# Patient Record
Sex: FEMALE | Race: WHITE | NOT HISPANIC OR LATINO | URBAN - METROPOLITAN AREA
[De-identification: names, ages, dates, MRNs, and addresses within clinical notes are randomized per-mention and may not be internally consistent; named-entity substitution may affect disease eponyms.]

---

## 2017-05-19 NOTE — H&P ADULT - PROBLEM SELECTOR PLAN 1
Admit to Orthopaedic Service.  Presents today for elective ACDF C4-C5, C5-C6, C6-C7, insertion spine cage, harvest bone graft.  Pt medically stable and cleared for procedure today by Dr. Jensen Sahu.

## 2017-05-19 NOTE — H&P ADULT - NSHPPHYSICALEXAM_GEN_ALL_CORE
Musculoskeletal: Decreased ROM secondary to pain, cervical spine.     Remainder of physical exam as per medical clearance note. Musculoskeletal: Decreased ROM secondary to pain, cervical spine.   Sensation diminished at radial and dorsal aspect of right hand. Motor Strength 5/5 to /interossei/triceps/biceps/deltoid LUE; motor strength 4+/5 to /interossei on RUE, 5/5 to biceps, deltoid. AIN/PIN intact.   Skin warm and well perfused, brisk capillary refill bilateral upper extremities      Remainder of physical exam as per medical clearance note. Musculoskeletal: Decreased ROM secondary to pain, cervical spine.   Sensation diminished at radial and dorsal aspect of right hand. Motor Strength 5/5 to /interossei/triceps/biceps/deltoid LUE; motor strength 4+/5 to /interossei on RUE, 5/5 to biceps, deltoid. AIN/PIN intact.   Skin warm and well perfused, brisk capillary refill bilateral upper extremities  EHL/FHL/TA/GS 5/5 motor strength bilateral lower extremities; SLT mildly decreased at medial aspect of left foot, DP pulses 2+ bilaterally      Remainder of physical exam as per medical clearance note.

## 2017-05-19 NOTE — PATIENT PROFILE ADULT. - PMH
Chronic pain  in neck, bilateral shoulders, lower back  related to work injury Anxiety    Chronic pain  in neck, bilateral shoulders, lower back  related to work injury  Depression Anxiety    Chronic pain  in neck, bilateral shoulders, lower back  related to work injury  Depression    Headache    Insomnia

## 2017-05-19 NOTE — H&P ADULT - PMH
Anxiety    Chronic pain  in neck, bilateral shoulders, lower back  related to work injury  Depression    Insomnia

## 2017-05-19 NOTE — H&P ADULT - NSHPLABSRESULTS_GEN_ALL_CORE
Preop CBC, BMP, PT/PTT/INR, UA - WNL per medical clearance.   Preop EKG normal sinus rhythm - WNL per medical clearance   Preop CXR no acute cardiopulmonary disease - WNL per medical clearance  Preop Pulmonary Function Tests Normal Spirometry - WNL as per medical clearance

## 2017-05-19 NOTE — H&P ADULT - HISTORY OF PRESENT ILLNESS
55 yo F presents c/o neck pain x     Presents today for elective Anterior Cervical Discectomy and Fusion C4-C5, C5-C6, C6-C7, anterior instrumentation, insertion spine cage, harvest bone graft. 55 yo F presents c/o neck pain x 3-4 years. 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 Pt reports bilateral upper and lower extremity numbness/tingling/weakness which she reports as constant and worse on the right side. Pt reports frequent headaches. Pt states her neck pain radiates to bilateral upper extremities. Pt takes naproxen at home for pain relief. Pt does not ambulate with an assistive device at baseline. Denies DVT hx. Denies CP, SOB, N/V, tactile fevers today.    Presents today for elective Anterior Cervical Discectomy and Fusion C4-C5, C5-C6, C6-C7, anterior instrumentation, insertion spine cage, harvest bone graft.

## 2017-05-22 ENCOUNTER — INPATIENT (INPATIENT)
Facility: HOSPITAL | Age: 57
LOS: 2 days | Discharge: HOME | DRG: 472 | End: 2017-05-25
Payer: OTHER MISCELLANEOUS

## 2017-05-22 VITALS
HEART RATE: 62 BPM | OXYGEN SATURATION: 99 % | SYSTOLIC BLOOD PRESSURE: 105 MMHG | HEIGHT: 64 IN | WEIGHT: 153.44 LBS | DIASTOLIC BLOOD PRESSURE: 52 MMHG | TEMPERATURE: 98 F | RESPIRATION RATE: 20 BRPM

## 2017-05-22 DIAGNOSIS — Z98.890 OTHER SPECIFIED POSTPROCEDURAL STATES: Chronic | ICD-10-CM

## 2017-05-22 DIAGNOSIS — M54.12 RADICULOPATHY, CERVICAL REGION: ICD-10-CM

## 2017-05-22 RX ORDER — FAMOTIDINE 10 MG/ML
20 INJECTION INTRAVENOUS EVERY 12 HOURS
Qty: 0 | Refills: 0 | Status: DISCONTINUED | OUTPATIENT
Start: 2017-05-22 | End: 2017-05-25

## 2017-05-22 RX ORDER — CITALOPRAM 10 MG/1
1 TABLET, FILM COATED ORAL
Qty: 0 | Refills: 0 | COMMUNITY

## 2017-05-22 RX ORDER — ONDANSETRON 8 MG/1
4 TABLET, FILM COATED ORAL EVERY 6 HOURS
Qty: 0 | Refills: 0 | Status: DISCONTINUED | OUTPATIENT
Start: 2017-05-22 | End: 2017-05-25

## 2017-05-22 RX ORDER — CEFAZOLIN SODIUM 1 G
2000 VIAL (EA) INJECTION EVERY 8 HOURS
Qty: 0 | Refills: 0 | Status: COMPLETED | OUTPATIENT
Start: 2017-05-22 | End: 2017-05-23

## 2017-05-22 RX ORDER — MAGNESIUM HYDROXIDE 400 MG/1
30 TABLET, CHEWABLE ORAL EVERY 12 HOURS
Qty: 0 | Refills: 0 | Status: DISCONTINUED | OUTPATIENT
Start: 2017-05-22 | End: 2017-05-25

## 2017-05-22 RX ORDER — HYDROMORPHONE HYDROCHLORIDE 2 MG/ML
30 INJECTION INTRAMUSCULAR; INTRAVENOUS; SUBCUTANEOUS
Qty: 0 | Refills: 0 | Status: DISCONTINUED | OUTPATIENT
Start: 2017-05-22 | End: 2017-05-23

## 2017-05-22 RX ORDER — HYDROMORPHONE HYDROCHLORIDE 2 MG/ML
0.5 INJECTION INTRAMUSCULAR; INTRAVENOUS; SUBCUTANEOUS
Qty: 0 | Refills: 0 | Status: DISCONTINUED | OUTPATIENT
Start: 2017-05-22 | End: 2017-05-23

## 2017-05-22 RX ORDER — SODIUM CHLORIDE 9 MG/ML
1000 INJECTION, SOLUTION INTRAVENOUS
Qty: 0 | Refills: 0 | Status: DISCONTINUED | OUTPATIENT
Start: 2017-05-22 | End: 2017-05-25

## 2017-05-22 RX ORDER — NALOXONE HYDROCHLORIDE 4 MG/.1ML
0.1 SPRAY NASAL
Qty: 0 | Refills: 0 | Status: DISCONTINUED | OUTPATIENT
Start: 2017-05-22 | End: 2017-05-25

## 2017-05-22 RX ORDER — DOCUSATE SODIUM 100 MG
100 CAPSULE ORAL THREE TIMES A DAY
Qty: 0 | Refills: 0 | Status: DISCONTINUED | OUTPATIENT
Start: 2017-05-22 | End: 2017-05-25

## 2017-05-22 RX ORDER — METOCLOPRAMIDE HCL 10 MG
10 TABLET ORAL EVERY 6 HOURS
Qty: 0 | Refills: 0 | Status: DISCONTINUED | OUTPATIENT
Start: 2017-05-22 | End: 2017-05-25

## 2017-05-22 RX ADMIN — Medication 100 MILLIGRAM(S): at 18:40

## 2017-05-22 RX ADMIN — HYDROMORPHONE HYDROCHLORIDE 30 MILLILITER(S): 2 INJECTION INTRAMUSCULAR; INTRAVENOUS; SUBCUTANEOUS at 12:44

## 2017-05-22 NOTE — CONSULT NOTE ADULT - SUBJECTIVE AND OBJECTIVE BOX
RYLIE KAUFMAN      Patient is a 56y old  Female who presents with a chief complaint of neck pain (22 May 2017 07:52)      HPI:  57 yo F presents c/o neck pain x 3-4 years. 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 Pt reports bilateral upper and lower extremity numbness/tingling/weakness which she reports as constant and worse on the right side. Pt reports frequent headaches. Pt states her neck pain radiates to bilateral upper extremities. Pt takes naproxen at home for pain relief. Pt does not ambulate with an assistive device at baseline. Denies DVT hx. Denies CP, SOB, N/V, tactile fevers today.    Presents today for elective Anterior Cervical Discectomy and Fusion C4-C5, C5-C6, C6-C7, anterior instrumentation, insertion spine cage, harvest bone graft. (19 May 2017 15:26)      Addl  Medical issues:       HEALTH ISSUES - PROBLEM Dx:  Radiculopathy, cervical region: Radiculopathy, cervical region            MEDICATIONS  (STANDING):  HYDROmorphone PCA (1 mG/mL) 30milliLiter(s) PCA Continuous PCA Continuous  lactated ringers. 1000milliLiter(s) IV Continuous <Continuous>  ceFAZolin   IVPB 2000milliGRAM(s) IV Intermittent every 8 hours  docusate sodium 100milliGRAM(s) Oral three times a day    MEDICATIONS  (PRN):  HYDROmorphone PCA (1 mG/mL) Rescue Clinician Bolus 0.5milliGRAM(s) IV Push every 3 hours PRN for Pain Scale GREATER THAN 6  naloxone Injectable 0.1milliGRAM(s) IV Push every 3 minutes PRN For ANY of the following changes in patient status:  A. RR LESS THAN 10 breaths per minute, B. Oxygen saturation LESS THAN 90%, C. Sedation score of 6  ondansetron Injectable 4milliGRAM(s) IV Push every 6 hours PRN Nausea  famotidine    Tablet 20milliGRAM(s) Oral every 12 hours PRN Dyspepsia  magnesium hydroxide Suspension 30milliLiter(s) Oral every 12 hours PRN Constipation          PAST MEDICAL & SURGICAL HISTORY:  Headache  Insomnia  Depression  Anxiety  Chronic pain: in neck, bilateral shoulders, lower back  related to work injury  H/O shoulder surgery: right      REVIEW OF SYSTEMS:  [x] As per HPI          Reviewed   no change                            Changes noted  CONSTITUTIONAL: No fever, weight loss, or fatigue  RESPIRATORY: No cough, wheezing, chills or hemoptysis; No Shortness of Breath  CARDIOVASCULAR: No chest pain, palpitations, dizziness, or leg swelling  GASTROINTESTINAL: No abdominal or epigastric pain. No nausea, vomiting, or hematemesis; No diarrhea or constipation. No melena or hematochezia.  MUSCULOSKELETAL: No joint pain or swelling; No muscle, back, or extremity pain  Neuro:   Grossly  Negative  Psych        Awake  alert  [x] All others negative	  [ ] Unable to obtain      Vital Signs Last 24 Hrs  T(C): 36.1, Max: 36.6 (05-22 @ 07:48)  T(F): 97, Max: 97.8 (05-22 @ 07:48)  HR: 57 (54 - 76)  BP: 119/65 (105/52 - 127/64)  BP(mean): --  RR: 16 (12 - 20)  SpO2: 98% (98% - 100%)    PHYSICAL EXAM:      Constitutional:    Eyes:    ENMT:    Neck:    Breasts:    Back:    Respiratory:    Cardiovascular:    Gastrointestinal:    Genitourinary:    Rectal:    Extremities:    Vascular:    Neurological:    Skin:    Lymph Nodes:    Musculoskeletal:    Psychiatric:                      CAPILLARY BLOOD GLUCOSE      I&O's Summary    I & Os for current day (as of 22 May 2017 18:49)  =============================================  IN: 0 ml / OUT: 750 ml / NET: -750 ml          ASSESSMENT/PLAN/RECOMMENDATIONS RYLIE KAUFMAN      Patient is a 56y old  Female who presents with a chief complaint of neck pain (22 May 2017 07:52)      HPI:  55 yo F presents c/o neck pain x 3-4 years. 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 Pt reports bilateral upper and lower extremity numbness/tingling/weakness which she reports as constant and worse on the right side. Pt reports frequent headaches. Pt states her neck pain radiates to bilateral upper extremities. Pt takes naproxen at home for pain relief. Pt does not ambulate with an assistive device at baseline. Denies DVT hx. Denies CP, SOB, N/V, tactile fevers today.    Presents today for elective Anterior Cervical Discectomy and Fusion C4-C5, C5-C6, C6-C7, anterior instrumentation, insertion spine cage, harvest bone graft. (19 May 2017 15:26)      Addl  Medical issues:       HEALTH ISSUES - PROBLEM Dx:  Radiculopathy, cervical region: Radiculopathy, cervical region            MEDICATIONS  (STANDING):  HYDROmorphone PCA (1 mG/mL) 30milliLiter(s) PCA Continuous PCA Continuous  lactated ringers. 1000milliLiter(s) IV Continuous <Continuous>  ceFAZolin   IVPB 2000milliGRAM(s) IV Intermittent every 8 hours  docusate sodium 100milliGRAM(s) Oral three times a day    MEDICATIONS  (PRN):  HYDROmorphone PCA (1 mG/mL) Rescue Clinician Bolus 0.5milliGRAM(s) IV Push every 3 hours PRN for Pain Scale GREATER THAN 6  naloxone Injectable 0.1milliGRAM(s) IV Push every 3 minutes PRN For ANY of the following changes in patient status:  A. RR LESS THAN 10 breaths per minute, B. Oxygen saturation LESS THAN 90%, C. Sedation score of 6  ondansetron Injectable 4milliGRAM(s) IV Push every 6 hours PRN Nausea  famotidine    Tablet 20milliGRAM(s) Oral every 12 hours PRN Dyspepsia  magnesium hydroxide Suspension 30milliLiter(s) Oral every 12 hours PRN Constipation          PAST MEDICAL & SURGICAL HISTORY:  Headache  Insomnia  Depression  Anxiety  Chronic pain: in neck, bilateral shoulders, lower back  related to work injury  H/O shoulder surgery: right      REVIEW OF SYSTEMS:  [x] As per HPI          Reviewed   no change                            Changes noted  CONSTITUTIONAL: No fever, weight loss, or fatigue  RESPIRATORY: No cough, wheezing, chills or hemoptysis; No Shortness of Breath  CARDIOVASCULAR: No chest pain, palpitations, dizziness, or leg swelling  GASTROINTESTINAL: No abdominal or epigastric pain. No nausea, vomiting, or hematemesis; No diarrhea or constipation. No melena or hematochezia.  MUSCULOSKELETAL: C spine  Neuro:   Grossly  Negative  Psych        Awake  alert  [x] All others negative	  [ ] Unable to obtain      Vital Signs Last 24 Hrs  T(C): 36.1, Max: 36.6 (05-22 @ 07:48)  T(F): 97, Max: 97.8 (05-22 @ 07:48)  HR: 57 (54 - 76)  BP: 119/65 (105/52 - 127/64)  BP(mean): --  RR: 16 (12 - 20)  SpO2: 98% (98% - 100%)    PHYSICAL EXAM:      Constitutional:comfortable    Eyes:    ENMT:neg    Neck:s/p surgery    Breasts:d    Back:    Respiratory:clear to A    Cardiovascular:s1s2    Gastrointestinal:soft    Genitourinary:    Rectal:    Extremities:neg    Vascular:    Neurological:non focal    Skin:    Lymph Nodes:    Musculoskeletal:neg    Psychiatric:awake                      CAPILLARY BLOOD GLUCOSE      I&O's Summary    I & Os for current day (as of 22 May 2017 18:49)  =============================================  IN: 0 ml / OUT: 750 ml / NET: -750 ml          ASSESSMENT/PLAN/RECOMMENDATIONS

## 2017-05-22 NOTE — DISCHARGE NOTE ADULT - MEDICATION SUMMARY - MEDICATIONS TO TAKE
I will START or STAY ON the medications listed below when I get home from the hospital:    acetaminophen-oxycodone 325 mg-10 mg oral tablet  -- 0.5-1 tab(s) by mouth every 4 hours, As Needed -for severe pain MDD:6  -- Caution federal law prohibits the transfer of this drug to any person other  than the person for whom it was prescribed.  May cause drowsiness.  Alcohol may intensify this effect.  Use care when operating dangerous machinery.  This prescription cannot be refilled.  This product contains acetaminophen.  Do not use  with any other product containing acetaminophen to prevent possible liver damage.  Using more of this medication than prescribed may cause serious breathing problems.    -- Indication: For Pain    citalopram 20 mg oral tablet  -- 1 tab(s) by mouth once a day  -- Indication: For Home med    zaleplon 5 mg oral capsule  -- 1 cap(s) by mouth once a day (at bedtime), As Needed  -- Indication: For Home med    docusate sodium 100 mg oral capsule  -- 1 cap(s) by mouth 3 times a day  -- Indication: For Constipation

## 2017-05-22 NOTE — DISCHARGE NOTE ADULT - PATIENT PORTAL LINK FT
“You can access the Follow[employer] Patient Portal, offered by [facility], by registering with the following website: [email or web address]”

## 2017-05-22 NOTE — DISCHARGE NOTE ADULT - ADDITIONAL INSTRUCTIONS
No strenuous activity, heavy lifting, driving, tub bathing, or returning to work until cleared by MD.  You may shower  Remove dressing after post op day 5, then leave incision open to air.  Follow up with Dr. Garcia in his office in 2 weeks.  Any staples/sutures will be removed in 2 weeks.   If you don't have a bowel movement by post op day 3, then take Milk of Magnesia (over the counter).  If no bowel movement by at least post op day 5, then use a Dulcolax suppository (over the counter) and/or a Fleets enema--if still no bowel movement, call your MD.  Contact your doctor if you experience: fever greater than 101.5, chills, chest pain, difficulty breathing, bleeding, redness or heat around the incision.    Please follow up with your primary care provider. No strenuous activity, heavy lifting, driving, tub bathing, or returning to work until cleared by MD.  You may shower  Change dressing daily.  Remove dressing after post op day 5, then leave incision open to air.  Follow up with Dr. Garcia in his office in 2 weeks.  Any staples/sutures will be removed in 2 weeks.   If you don't have a bowel movement by post op day 3, then take Milk of Magnesia (over the counter).  If no bowel movement by at least post op day 5, then use a Dulcolax suppository (over the counter) and/or a Fleets enema--if still no bowel movement, call your MD.  Contact your doctor if you experience: fever greater than 101.5, chills, chest pain, difficulty breathing, bleeding, redness or heat around the incision.    Please follow up with your primary care provider.

## 2017-05-22 NOTE — CONSULT NOTE ADULT - ATTENDING COMMENTS
s/p spinal procedure  CV stable  Pain management s/p spinal procedure  CV stable  Pain management  s/p C spine

## 2017-05-22 NOTE — DISCHARGE NOTE ADULT - HOSPITAL COURSE
Admitted  Surgery -  Perioperative abx  Pain control  DVT ppx Admitted  Surgery - ACDF C4 to C7 5/22/17  Perioperative abx  Pain control  DVT ppx

## 2017-05-22 NOTE — CONSULT NOTE ADULT - SUBJECTIVE AND OBJECTIVE BOX
Pain Management Consult Note - [facility] [phone]    Chief Complaint:  Neck pain    HPI:  55 yo F presents c/o neck pain x 3-4 years. 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 Pt reports bilateral upper and lower extremity numbness/tingling/weakness which she reports as constant and worse on the right side. Pt reports frequent headaches. Pt states her neck pain radiates to bilateral upper extremities. Pt takes naproxen at home for pain relief. Pt does not ambulate with an assistive device at baseline. Denies DVT hx. Denies CP, SOB, N/V, tactile fevers today.  Pt will use tylenol #3 as an outpatient.    Presents today for elective Anterior Cervical Discectomy and Fusion C4-C5, C5-C6, C6-C7.    Pain is ___ sharp ____dull ___burning _x__achy ___ Intensity: ____ mild __x_mod ___severe     Location __x__surgical site __x__cervical _____lumbar ____abd ____upper ext____lower ext    Worse with __x__activity ____movement _____physical therapy___ Rest    Improved with __x__medication __x__rest ____physical therapy    ROS: Const:  ___febrile   Eyes:___ENT:___CV: ___chest pain  Resp: ____sob  GI:_-__nausea _-__vomiting ___abd pain ___npo ___clears __full diet __bm  :___ Musk: ___pain ___spasm  Skin:___ Neuro:  __-_lgoofsdt_-__ugryumxoe___ numbness ___weakness ___paresth  Psych:__anxiety  Endo:___ Heme:___, ___all others reviewed and negative    Allergies:  No Known Allergies    PAST MEDICAL & SURGICAL HISTORY:  Headache  Insomnia  Depression  Anxiety  Chronic pain: in neck, bilateral shoulders, lower back  related to work injury  H/O shoulder surgery: right      SH: __-_Tobacco   _-__Alcohol                          FH:FAMILY HISTORY:  No pertinent family history in first degree relatives      HYDROmorphone PCA (1 mG/mL) 30milliLiter(s) PCA Continuous PCA Continuous  HYDROmorphone PCA (1 mG/mL) Rescue Clinician Bolus 0.5milliGRAM(s) IV Push every 3 hours PRN  naloxone Injectable 0.1milliGRAM(s) IV Push every 3 minutes PRN  ondansetron Injectable 4milliGRAM(s) IV Push every 6 hours PRN      T(C): 36.1, Max: 36.6 (05-22 @ 07:48)  HR: 76 (62 - 76)  BP: 122/63 (105/52 - 122/63)  RR: 14 (14 - 20)  SpO2: 100% (99% - 100%)  Wt(kg): --    PHYSICAL EXAM:  Gen Appearance: _x__no acute distress _x__appropriate        Neuro: _x__SILT feet____ EOM Intact Psych: AAOX__, _x__mood/affect appropriate        Eyes: __x_conjunctiva WNL  __x___ Pupils equal and round        ENT: __x_ears and nose atraumatic__x_ Hearing grossly intact        Neck: _x__trachea midline, no visible masses ___thyroid without palpable mass    Resp: __x_Nml WOB____No tactile fremitus ___clear to auscultation    Cardio: ___extremities free from edema ____pedal pulses palpable    GI/Abdomen: ___soft _____ Nontender______Nondistended_____HSM    Lymphatic: _x__no palpable nodes in neck  ___no palpable nodes calves and feet    Skin/Wound: x___Incision, _x__Dressing c/d/i,   ____surrounding tissues soft,  ___drain/chest tube present____    Muscular: EHL ___/5  Gastrocnemius___/5    ___absent clubbing/cyanosis      ASSESSMENT: This is a 56y old Female with a history of   M54.12: M54.12  No h/o HF  No pertinent family history in first degree relatives  Headache  Insomnia  Depression  Anxiety  Chronic pain: in neck, bilateral shoulders, lower back  related to work injury  Radiculopathy, cervical region: Radiculopathy, cervical region  Radiculopathy, cervical region: Radiculopathy, cervical region  H/O shoulder surgery: right  Neck Pain s/p Anterior Cervical Discectomy and Fusion C4-C5, C5-C6, C6-C7.      Recommended Treatment PLAN:    1.  Dilaudid IV PCA 0/0.2/6min with 0.5mg IV bolus q3h prn

## 2017-05-22 NOTE — DISCHARGE NOTE ADULT - CARE PLAN
Principal Discharge DX:	Radiculopathy, cervical region  Goal:	improvement after surgery  Instructions for follow-up, activity and diet:	see below

## 2017-05-22 NOTE — PROGRESS NOTE ADULT - SUBJECTIVE AND OBJECTIVE BOX
Orthopaedics Post Op Check    Procedure: ACDF C4-C7  Surgeon: Dr. Garcia    Pt comfortable, without complaints  Denies CP, SOB, N/V, numbness/tingling     Vital Signs Last 24 Hrs  T(C): 36.1, Max: 36.6 (05-22 @ 07:48)  T(F): 97, Max: 97.8 (05-22 @ 07:48)  HR: 60 (54 - 76)  BP: 120/62 (105/52 - 127/64)  BP(mean): --  RR: 16 (12 - 20)  SpO2: 100% (99% - 100%)  AVSS, NAD    Dressing C/D/I, 1 hemovac drain  General: Pt Alert and oriented     Pulses: DP pulses 2+ bilaterally   Sensation intact to bilateral UE distally, mildly decreased at radial aspect of right hand. Motor Strength 5/5 to /interossei/triceps/biceps/deltoid bilaterally. AIN/PIN intact.       Post op XR: Fluoroscopy utilized in OR to confirm hardware placement    A/P: 56yFemale POD#0 s/p ACDF C4-C7  - Stable  - Pain Control  - DVT ppx: SCDs  - Post op abx: Ancef  - PT, WBS: WBAT  - F/U AM Labs

## 2017-05-22 NOTE — DISCHARGE NOTE ADULT - MEDICATION SUMMARY - MEDICATIONS TO STOP TAKING
I will STOP taking the medications listed below when I get home from the hospital:    naproxen 500 mg oral tablet  -- 1 tab(s) by mouth 2 times a day, As Needed    acetaminophen-codeine 300 mg-30 mg oral tablet  -- 1 tab(s) by mouth every 6 hours, As Needed

## 2017-05-23 LAB
ANION GAP SERPL CALC-SCNC: 8 MMOL/L — SIGNIFICANT CHANGE UP (ref 5–17)
BASOPHILS NFR BLD AUTO: 0.1 % — SIGNIFICANT CHANGE UP (ref 0–2)
BUN SERPL-MCNC: 12 MG/DL — SIGNIFICANT CHANGE UP (ref 7–23)
CALCIUM SERPL-MCNC: 8.5 MG/DL — SIGNIFICANT CHANGE UP (ref 8.4–10.5)
CHLORIDE SERPL-SCNC: 103 MMOL/L — SIGNIFICANT CHANGE UP (ref 96–108)
CO2 SERPL-SCNC: 29 MMOL/L — SIGNIFICANT CHANGE UP (ref 22–31)
CREAT SERPL-MCNC: 0.8 MG/DL — SIGNIFICANT CHANGE UP (ref 0.5–1.3)
EOSINOPHIL NFR BLD AUTO: 0 % — SIGNIFICANT CHANGE UP (ref 0–6)
GLUCOSE SERPL-MCNC: 110 MG/DL — HIGH (ref 70–99)
HCT VFR BLD CALC: 36.2 % — SIGNIFICANT CHANGE UP (ref 34.5–45)
HGB BLD-MCNC: 12 G/DL — SIGNIFICANT CHANGE UP (ref 11.5–15.5)
LYMPHOCYTES # BLD AUTO: 12 % — LOW (ref 13–44)
MCHC RBC-ENTMCNC: 28.1 PG — SIGNIFICANT CHANGE UP (ref 27–34)
MCHC RBC-ENTMCNC: 33.1 G/DL — SIGNIFICANT CHANGE UP (ref 32–36)
MCV RBC AUTO: 84.8 FL — SIGNIFICANT CHANGE UP (ref 80–100)
MONOCYTES NFR BLD AUTO: 5.4 % — SIGNIFICANT CHANGE UP (ref 2–14)
NEUTROPHILS NFR BLD AUTO: 82.5 % — HIGH (ref 43–77)
PLATELET # BLD AUTO: 211 K/UL — SIGNIFICANT CHANGE UP (ref 150–400)
POTASSIUM SERPL-MCNC: 4.1 MMOL/L — SIGNIFICANT CHANGE UP (ref 3.5–5.3)
POTASSIUM SERPL-SCNC: 4.1 MMOL/L — SIGNIFICANT CHANGE UP (ref 3.5–5.3)
RBC # BLD: 4.27 M/UL — SIGNIFICANT CHANGE UP (ref 3.8–5.2)
RBC # FLD: 13.9 % — SIGNIFICANT CHANGE UP (ref 10.3–16.9)
SODIUM SERPL-SCNC: 140 MMOL/L — SIGNIFICANT CHANGE UP (ref 135–145)
WBC # BLD: 11.6 K/UL — HIGH (ref 3.8–10.5)
WBC # FLD AUTO: 11.6 K/UL — HIGH (ref 3.8–10.5)

## 2017-05-23 RX ORDER — ACETAMINOPHEN 500 MG
975 TABLET ORAL EVERY 8 HOURS
Qty: 0 | Refills: 0 | Status: DISCONTINUED | OUTPATIENT
Start: 2017-05-23 | End: 2017-05-25

## 2017-05-23 RX ORDER — OXYCODONE HYDROCHLORIDE 5 MG/1
10 TABLET ORAL EVERY 4 HOURS
Qty: 0 | Refills: 0 | Status: DISCONTINUED | OUTPATIENT
Start: 2017-05-23 | End: 2017-05-25

## 2017-05-23 RX ORDER — OXYCODONE HYDROCHLORIDE 5 MG/1
5 TABLET ORAL EVERY 4 HOURS
Qty: 0 | Refills: 0 | Status: DISCONTINUED | OUTPATIENT
Start: 2017-05-23 | End: 2017-05-25

## 2017-05-23 RX ORDER — BENZOCAINE AND MENTHOL 5; 1 G/100ML; G/100ML
1 LIQUID ORAL
Qty: 0 | Refills: 0 | Status: DISCONTINUED | OUTPATIENT
Start: 2017-05-23 | End: 2017-05-25

## 2017-05-23 RX ORDER — HYDROMORPHONE HYDROCHLORIDE 2 MG/ML
0.5 INJECTION INTRAMUSCULAR; INTRAVENOUS; SUBCUTANEOUS
Qty: 0 | Refills: 0 | Status: DISCONTINUED | OUTPATIENT
Start: 2017-05-23 | End: 2017-05-25

## 2017-05-23 RX ADMIN — Medication 975 MILLIGRAM(S): at 21:07

## 2017-05-23 RX ADMIN — OXYCODONE HYDROCHLORIDE 10 MILLIGRAM(S): 5 TABLET ORAL at 13:57

## 2017-05-23 RX ADMIN — Medication 100 MILLIGRAM(S): at 21:07

## 2017-05-23 RX ADMIN — Medication 100 MILLIGRAM(S): at 02:14

## 2017-05-23 RX ADMIN — Medication 100 MILLIGRAM(S): at 06:52

## 2017-05-23 RX ADMIN — OXYCODONE HYDROCHLORIDE 10 MILLIGRAM(S): 5 TABLET ORAL at 14:30

## 2017-05-23 RX ADMIN — Medication 975 MILLIGRAM(S): at 13:52

## 2017-05-23 RX ADMIN — OXYCODONE HYDROCHLORIDE 10 MILLIGRAM(S): 5 TABLET ORAL at 23:12

## 2017-05-23 RX ADMIN — ONDANSETRON 4 MILLIGRAM(S): 8 TABLET, FILM COATED ORAL at 12:54

## 2017-05-23 RX ADMIN — Medication 100 MILLIGRAM(S): at 13:53

## 2017-05-23 RX ADMIN — OXYCODONE HYDROCHLORIDE 10 MILLIGRAM(S): 5 TABLET ORAL at 18:57

## 2017-05-23 NOTE — PROGRESS NOTE ADULT - ASSESSMENT
A/P: 56F s/p ACDF C4-C7  -stable  -pain control  -PT/WBAT  -SCD  -diet as tolerated  -f/u labs  -disposition: Home

## 2017-05-23 NOTE — PROGRESS NOTE ADULT - SUBJECTIVE AND OBJECTIVE BOX
Pt. seen at 1118  Pain Management Progress Note - [facility] [phone]    HPI:  Pt. complains of neck pain and is feeling dizzy and nauseated.  Denies vomiting.            Pertinent PMH: Pain at: ___Back _x__Neck___Knee ___Hip ___Shoulder ___ Opioid tolerance    Pain is ___ sharp ____dull ___burning ___achy ___ Intensity: ____ mild ____mod ____severe     Location ___x__surgical site _____cervical _____lumbar ____abd _____upper ext____lower ext    Worse with _x___activity __x__movement _____physical therapy___ Rest    Improved with _x___medication _x___rest ____physical therapy      naloxone Injectable  ondansetron Injectable  lactated ringers.    famotidine    Tablet  docusate sodium  magnesium hydroxide Suspension  metoclopramide Injectable  benzocaine 15 mG/menthol 3.6 mG Lozenge  oxyCODONE IR  oxyCODONE IR  HYDROmorphone  Injectable  acetaminophen   Tablet      ROS: Const:  ___febrile   Eyes:___ENT:___CV: ___chest pain  Resp: ____sob  GI:__+_nausea _-__vomiting ____abd pain ___npo ___clears _+__full diet __bm  :___ Musk: ___pain ___spasm  Skin:___ Neuro:  __-_nxekbqmb_-__olqwavnjp____ numbness ___weakness ___paresthesia  Psych:___anxiety  Endo:___ Heme:___Allergy:___      05-23 @ 07:4283 mL/min/1.73M2          Hemoglobin: 12.0 g/dL (05-23 @ 07:42)        T(C): 36.7, Max: 37.1 (05-23 @ 05:55)  HR: 59 (57 - 83)  BP: 100/50 (93/54 - 116/56)  RR: 16 (15 - 16)  SpO2: 99% (98% - 99%)  Wt(kg): --     PHYSICAL EXAM:  Gen Appearance: __x_no acute distress _x__appropriate         Neuro: _x__SILT feet__x__ EOM Intact Psych: AAOX3__, _x__mood/affect appropriate        Eyes: _x__conjunctiva WNL  ____x_ Pupils equal and round        ENT: _x__ears and nose atraumatic_x__ Hearing grossly intact        Neck: ___trachea midline, no visible masses ___thyroid without palpable mass    Resp: _x__Nml WOB____No tactile fremitus ___clear to auscultation    Cardio: ___extremities free from edema ____pedal pulses palpable    GI/Abdomen: ___soft _____ Nontender______Nondistended_____HSM    Lymphatic: ___no palpable nodes in neck  ___no palpable nodes calves and feet    Skin/Wound: ___Incision, _x__Dressing c/d/i,   ____surrounding tissues soft,  _x__drain/chest tube present____    Muscular: EHL _5__/5  Gastrocnemius___/5    __x_absent clubbing/cyanosis         ASSESSMENT:  This is a 56y old Female with a history of:  Headache  Insomnia  Depression  Anxiety  Chronic pain  Radiculopathy, cervical region  H/O shoulder surgery  and neck pain S/P ACDF C4-C7 and is dizzy and nauseated.      Recommended Treatment PLAN:  1.  D/C PCA  2.  Oxycodone 5-10 mg po q4h prn  3.  Dilaudid 0.5 mg IV q2h prn  4.  Tylenol 975 mg po q8h

## 2017-05-23 NOTE — PROGRESS NOTE ADULT - SUBJECTIVE AND OBJECTIVE BOX
ORTHO NOTE    [ ] Pt seen/examined.  [ ] Pt without any complaints/in NAD.    [ ] Pt complains of:      ROS: [ ] Fever  [ ] Chills  [ ] CP [ ] SOB [ ] Dysnea  [ ] Palpitations [ ] Cough [ ] N/V/C/D [ ] Paresthia [ ] Other     [ ] ROS  otherwise negative    .    PHYSICAL EXAM:    Vital Signs Last 24 Hrs  T(C): 37.1, Max: 37.1 (05-23 @ 05:55)  T(F): 98.7, Max: 98.7 (05-23 @ 05:55)  HR: 63 (54 - 83)  BP: 106/51 (93/54 - 127/64)  BP(mean): --  RR: 16 (12 - 16)  SpO2: 98% (98% - 100%)    I&O's Detail  I & Os for 24h ending 23 May 2017 07:00  =============================================  IN:    lactated ringers.: 935 ml    IV PiggyBack: 50 ml    Total IN: 985 ml  ---------------------------------------------  OUT:    Indwelling Catheter - Urethral: 1300 ml    Drain: 40 ml    Total OUT: 1340 ml  ---------------------------------------------  Total NET: -355 ml    I & Os for current day (as of 23 May 2017 11:42)  =============================================  IN:    lactated ringers.: 500 ml    Total IN: 500 ml  ---------------------------------------------  OUT:    Indwelling Catheter - Urethral: 300 ml    Total OUT: 300 ml  ---------------------------------------------  Total NET: 200 ml       CAPILLARY BLOOD GLUCOSE                  Neuro:    Lungs:    CV:    ABD:     Ext:    LABS                        12.0   11.6  )-----------( 211      ( 23 May 2017 07:42 )             36.2                                05-23    140  |  103  |  12  ----------------------------<  110<H>  4.1   |  29  |  0.80    Ca    8.5      23 May 2017 07:42        [ ] Other Labs  [ ] None ordered            Please check or [place] when present:  •  Heart Failure:    [ ] Acute        [ ]  Acute on Chronic        [ ] Chronic         [ ] Diastolic     [ ]  Combined    •  ABEBA:     [ ] ATN        [ ]  Renal medullary necrosis       [ ]  Renal cortical necrosis                  [ ] Other pathological Lesion:  •  CKD:  [ ] Stage I   [ ] Stage II  [ ] Stage III    [ ]Stage IV   [ ]  CKD V   [ ]  Other/Unspecified:    •  Abdominal Nutritional Status:   [ ] Malnutrition-See Nutrition note    [ ] Cachexia   [ ]  Other        [ ] Supplement ordered:            [ ] Morbid Obesity: BMI >=40         ASSESSMENT/PLAN:      STATUS POST: ACDF C4 to C7 pod 1    CONTINUE:          [ ] PT    [ ] DVT PPX-    [ ] Pain Mgt    [ ] Dispo plan- ORTHO NOTE    [X ] Pt seen/examined.  [ ] Pt without any complaints/in NAD.    [X ] Pt complains of:  incisional pain and painful swallowing.  PCA helps.  RUE weakness and numbness are improving.        ROS: [ ] Fever  [ ] Chills  [ ] CP [ ] SOB [ ] Dysnea  [ ] Palpitations [ ] Cough [ ] N/V/C/D [ ] Paresthia [ ] Other     [X ] ROS  otherwise negative    .    PHYSICAL EXAM:    Vital Signs Last 24 Hrs  T(C): 37.1, Max: 37.1 (05-23 @ 05:55)  T(F): 98.7, Max: 98.7 (05-23 @ 05:55)  HR: 63 (54 - 83)  BP: 106/51 (93/54 - 127/64)  BP(mean): --  RR: 16 (12 - 16)  SpO2: 98% (98% - 100%)    I&O's Detail  I & Os for 24h ending 23 May 2017 07:00  =============================================  IN:    lactated ringers.: 935 ml    IV PiggyBack: 50 ml    Total IN: 985 ml  ---------------------------------------------  OUT:    Indwelling Catheter - Urethral: 1300 ml    Drain: 40 ml    Total OUT: 1340 ml  ---------------------------------------------  Total NET: -355 ml    I & Os for current day (as of 23 May 2017 11:42)  =============================================  IN:    lactated ringers.: 500 ml    Total IN: 500 ml  ---------------------------------------------  OUT:    Indwelling Catheter - Urethral: 300 ml    Total OUT: 300 ml  ---------------------------------------------  Total NET: 200 ml       CAPILLARY BLOOD GLUCOSE                  Neuro:  NAD A and O x 3  Neck dsg c/d/i 1 HV in place  Lungs:    CV:    ABD:     Ext:  UE strength 5/5 silt b/l    [place]    LABS                        12.0   11.6  )-----------( 211      ( 23 May 2017 07:42 )             36.2                                05-23    140  |  103  |  12  ----------------------------<  110<H>  4.1   |  29  |  0.80    Ca    8.5      23 May 2017 07:42        [ ] Other Labs  [ ] None ordered            Please check or [place] when present:  •  Heart Failure:    [ ] Acute        [ ]  Acute on Chronic        [ ] Chronic         [ ] Diastolic     [ ]  Combined    •  ABEBA:     [ ] ATN        [ ]  Renal medullary necrosis       [ ]  Renal cortical necrosis                  [ ] Other pathological Lesion:  •  CKD:  [ ] Stage I   [ ] Stage II  [ ] Stage III    [ ]Stage IV   [ ]  CKD V   [ ]  Other/Unspecified:    •  Abdominal Nutritional Status:   [ ] Malnutrition-See Nutrition note    [ ] Cachexia   [ ]  Other        [ ] Supplement ordered:            [ ] Morbid Obesity: BMI >=40         ASSESSMENT/PLAN:      STATUS POST: ACDF C4 to C7 pod 1    CONTINUE:          [ ] PT wbat    [ ] DVT PPX- scd    [ ] Pain Mgmt - service is following, d/c PCA    [ ] Dispo plan- home    Con't HV.  IS use.  Bowel regimen.  Dr Sahu for med.  Void trial.

## 2017-05-23 NOTE — PROGRESS NOTE ADULT - SUBJECTIVE AND OBJECTIVE BOX
S: Patient seen and examined. Doing well this AM. Pain reported but controlled. No acute events overnight.    O:  Vital Signs Last 24 Hrs  T(C): 37.1, Max: 37.1 (05-23 @ 05:55)  T(F): 98.7, Max: 98.7 (05-23 @ 05:55)  HR: 64 (54 - 83)  BP: 93/54 (93/54 - 127/64)  BP(mean): --  RR: 16 (12 - 20)  SpO2: 98% (98% - 100%)    Motor: BL intact WG/WF/WE/T/Deltoid, able to fire fingers actively  BL AIN/PIN/Ulnar nerve distribution intact  BL radial and ulnar a. well perfused  Dressing C/D/I. Drain 40mL

## 2017-05-23 NOTE — PROGRESS NOTE ADULT - SUBJECTIVE AND OBJECTIVE BOX
Patient is a 56y old  Female who presents with a chief complaint of neck pain (22 May 2017 07:52)      HPI:  55 yo F presents c/o neck pain x 3-4 years. 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 Pt reports bilateral upper and lower extremity numbness/tingling/weakness which she reports as constant and worse on the right side. Pt reports frequent headaches. Pt states her neck pain radiates to bilateral upper extremities. Pt takes naproxen at home for pain relief. Pt does not ambulate with an assistive device at baseline. Denies DVT hx. Denies CP, SOB, N/V, tactile fevers today.    Presents today for elective Anterior Cervical Discectomy and Fusion C4-C5, C5-C6, C6-C7, anterior instrumentation, insertion spine cage, harvest bone graft. (19 May 2017 15:26)    INTERVAL HPI/OVERNIGHT EVENTS:::s/p C spine    HEALTH ISSUES - PROBLEM Dx:  Radiculopathy, cervical region: Radiculopathy, cervical region          PAST MEDICAL & SURGICAL HISTORY:  Headache  Insomnia  Depression  Anxiety  Chronic pain: in neck, bilateral shoulders, lower back  related to work injury  H/O shoulder surgery: right          Consultant NOTE  REVIEWED  (   )    REVIEW OF SYSTEMS:  [x] As per HPI  CONSTITUTIONAL: No fever, weight loss, or fatigue  RESPIRATORY: No cough, wheezing, chills or hemoptysis; No Shortness of Breath  CARDIOVASCULAR: No chest pain, palpitations, dizziness, or leg swelling  GASTROINTESTINAL: No abdominal or epigastric pain. No nausea, vomiting, or hematemesis; No diarrhea or constipation. No melena or hematochezia.  MUSCULOSKELETAL: No joint pain or swelling; No muscle, back, or extremity pain  s/p C disc  PSYCH    awake, alert       [x] All others negative	  [ ] Unable to obtain          Vital Signs Last 24 Hrs  T(C): 36.9, Max: 37.1 (05-23 @ 05:55)  T(F): 98.5, Max: 98.7 (05-23 @ 05:55)  HR: 62 (59 - 83)  BP: 127/75 (93/54 - 127/75)  BP(mean): --  RR: 16 (15 - 17)  SpO2: 97% (95% - 99%)      I & Os for 24h ending 05-23 @ 07:00  =============================================  IN: 985 ml / OUT: 1340 ml / NET: -355 ml    I & Os for current day (as of 05-23 @ 20:59)  =============================================  IN: 1000 ml / OUT: 920 ml / NET: 80 ml    PHYSICAL EXAMINATION:                                    (  ++  )  NO CHANGE  Appearance: Normal	  HEENT:   Normal oral mucosa, PERRL, EOMI	  Neck: s/p Cspine  Cardiovascular: Normal S1 S2, No JVD, No murmurs,   Respiratory: Lungs clear to auscultation/Decreased Breath Sounds/No Rales, Rhonchi, Wheezing	  Gastrointestinal:  Soft, Non-tender, + BS	  Skin: No rashes, No ecchymoses, No cyanosis  Extremities: Normal range of motion, No clubbing, cyanosis or edema  Vascular: Peripheral pulses palpable 2+ bilaterally  Neurologic: Non-focal  Psychiatry: A & O x 3, Mood & affect appropriate    naloxone Injectable 0.1milliGRAM(s) IV Push every 3 minutes PRN  ondansetron Injectable 4milliGRAM(s) IV Push every 6 hours PRN  lactated ringers. 1000milliLiter(s) IV Continuous <Continuous>  famotidine    Tablet 20milliGRAM(s) Oral every 12 hours PRN  docusate sodium 100milliGRAM(s) Oral three times a day  magnesium hydroxide Suspension 30milliLiter(s) Oral every 12 hours PRN  metoclopramide Injectable 10milliGRAM(s) IV Push every 6 hours PRN  benzocaine 15 mG/menthol 3.6 mG Lozenge 1Lozenge Oral every 2 hours PRN  oxyCODONE IR 5milliGRAM(s) Oral every 4 hours PRN  oxyCODONE IR 10milliGRAM(s) Oral every 4 hours PRN  HYDROmorphone  Injectable 0.5milliGRAM(s) IV Push every 2 hours PRN  acetaminophen   Tablet 975milliGRAM(s) Oral every 8 hours                                      12.0   11.6  )-----------( 211      ( 23 May 2017 07:42 )             36.2     05-23    140  |  103  |  12  ----------------------------<  110<H>  4.1   |  29  |  0.80    Ca    8.5      23 May 2017 07:42        CAPILLARY BLOOD GLUCOSE

## 2017-05-24 DIAGNOSIS — F32.9 MAJOR DEPRESSIVE DISORDER, SINGLE EPISODE, UNSPECIFIED: ICD-10-CM

## 2017-05-24 DIAGNOSIS — G89.29 OTHER CHRONIC PAIN: ICD-10-CM

## 2017-05-24 LAB
ANION GAP SERPL CALC-SCNC: 10 MMOL/L — SIGNIFICANT CHANGE UP (ref 5–17)
BASOPHILS NFR BLD AUTO: 0.2 % — SIGNIFICANT CHANGE UP (ref 0–2)
BUN SERPL-MCNC: 11 MG/DL — SIGNIFICANT CHANGE UP (ref 7–23)
CALCIUM SERPL-MCNC: 8.6 MG/DL — SIGNIFICANT CHANGE UP (ref 8.4–10.5)
CHLORIDE SERPL-SCNC: 102 MMOL/L — SIGNIFICANT CHANGE UP (ref 96–108)
CO2 SERPL-SCNC: 30 MMOL/L — SIGNIFICANT CHANGE UP (ref 22–31)
CREAT SERPL-MCNC: 0.8 MG/DL — SIGNIFICANT CHANGE UP (ref 0.5–1.3)
EOSINOPHIL NFR BLD AUTO: 0.5 % — SIGNIFICANT CHANGE UP (ref 0–6)
GLUCOSE SERPL-MCNC: 95 MG/DL — SIGNIFICANT CHANGE UP (ref 70–99)
HCT VFR BLD CALC: 35.1 % — SIGNIFICANT CHANGE UP (ref 34.5–45)
HGB BLD-MCNC: 11.6 G/DL — SIGNIFICANT CHANGE UP (ref 11.5–15.5)
LYMPHOCYTES # BLD AUTO: 24.2 % — SIGNIFICANT CHANGE UP (ref 13–44)
MCHC RBC-ENTMCNC: 28.5 PG — SIGNIFICANT CHANGE UP (ref 27–34)
MCHC RBC-ENTMCNC: 33 G/DL — SIGNIFICANT CHANGE UP (ref 32–36)
MCV RBC AUTO: 86.2 FL — SIGNIFICANT CHANGE UP (ref 80–100)
MONOCYTES NFR BLD AUTO: 8 % — SIGNIFICANT CHANGE UP (ref 2–14)
NEUTROPHILS NFR BLD AUTO: 67.1 % — SIGNIFICANT CHANGE UP (ref 43–77)
PLATELET # BLD AUTO: 203 K/UL — SIGNIFICANT CHANGE UP (ref 150–400)
POTASSIUM SERPL-MCNC: 3.7 MMOL/L — SIGNIFICANT CHANGE UP (ref 3.5–5.3)
POTASSIUM SERPL-SCNC: 3.7 MMOL/L — SIGNIFICANT CHANGE UP (ref 3.5–5.3)
RBC # BLD: 4.07 M/UL — SIGNIFICANT CHANGE UP (ref 3.8–5.2)
RBC # FLD: 14.1 % — SIGNIFICANT CHANGE UP (ref 10.3–16.9)
SODIUM SERPL-SCNC: 142 MMOL/L — SIGNIFICANT CHANGE UP (ref 135–145)
WBC # BLD: 10.3 K/UL — SIGNIFICANT CHANGE UP (ref 3.8–10.5)
WBC # FLD AUTO: 10.3 K/UL — SIGNIFICANT CHANGE UP (ref 3.8–10.5)

## 2017-05-24 RX ORDER — SENNA PLUS 8.6 MG/1
2 TABLET ORAL AT BEDTIME
Qty: 0 | Refills: 0 | Status: DISCONTINUED | OUTPATIENT
Start: 2017-05-24 | End: 2017-05-25

## 2017-05-24 RX ADMIN — Medication 975 MILLIGRAM(S): at 13:40

## 2017-05-24 RX ADMIN — OXYCODONE HYDROCHLORIDE 10 MILLIGRAM(S): 5 TABLET ORAL at 14:40

## 2017-05-24 RX ADMIN — OXYCODONE HYDROCHLORIDE 10 MILLIGRAM(S): 5 TABLET ORAL at 09:34

## 2017-05-24 RX ADMIN — OXYCODONE HYDROCHLORIDE 10 MILLIGRAM(S): 5 TABLET ORAL at 13:40

## 2017-05-24 RX ADMIN — Medication 975 MILLIGRAM(S): at 05:03

## 2017-05-24 RX ADMIN — Medication 100 MILLIGRAM(S): at 05:02

## 2017-05-24 RX ADMIN — OXYCODONE HYDROCHLORIDE 10 MILLIGRAM(S): 5 TABLET ORAL at 05:03

## 2017-05-24 RX ADMIN — OXYCODONE HYDROCHLORIDE 10 MILLIGRAM(S): 5 TABLET ORAL at 10:34

## 2017-05-24 RX ADMIN — Medication 5 MILLIGRAM(S): at 18:00

## 2017-05-24 RX ADMIN — OXYCODONE HYDROCHLORIDE 10 MILLIGRAM(S): 5 TABLET ORAL at 19:01

## 2017-05-24 RX ADMIN — OXYCODONE HYDROCHLORIDE 10 MILLIGRAM(S): 5 TABLET ORAL at 00:12

## 2017-05-24 RX ADMIN — OXYCODONE HYDROCHLORIDE 10 MILLIGRAM(S): 5 TABLET ORAL at 18:01

## 2017-05-24 RX ADMIN — SENNA PLUS 2 TABLET(S): 8.6 TABLET ORAL at 21:59

## 2017-05-24 RX ADMIN — Medication 975 MILLIGRAM(S): at 21:59

## 2017-05-24 RX ADMIN — OXYCODONE HYDROCHLORIDE 10 MILLIGRAM(S): 5 TABLET ORAL at 06:03

## 2017-05-24 RX ADMIN — Medication 100 MILLIGRAM(S): at 21:59

## 2017-05-24 RX ADMIN — Medication 100 MILLIGRAM(S): at 13:41

## 2017-05-24 RX ADMIN — OXYCODONE HYDROCHLORIDE 10 MILLIGRAM(S): 5 TABLET ORAL at 23:01

## 2017-05-24 RX ADMIN — OXYCODONE HYDROCHLORIDE 10 MILLIGRAM(S): 5 TABLET ORAL at 22:01

## 2017-05-24 NOTE — PROGRESS NOTE ADULT - SUBJECTIVE AND OBJECTIVE BOX
Patient is a 56y old  Female who presents with a chief complaint of neck pain (22 May 2017 07:52)      HPI:  55 yo F presents c/o neck pain x 3-4 years. 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 Pt reports bilateral upper and lower extremity numbness/tingling/weakness which she reports as constant and worse on the right side. Pt reports frequent headaches. Pt states her neck pain radiates to bilateral upper extremities. Pt takes naproxen at home for pain relief. Pt does not ambulate with an assistive device at baseline. Denies DVT hx. Denies CP, SOB, N/V, tactile fevers today.    Presents today for elective Anterior Cervical Discectomy and Fusion C4-C5, C5-C6, C6-C7, anterior instrumentation, insertion spine cage, harvest bone graft. (19 May 2017 15:26)    INTERVAL HPI/OVERNIGHT EVENTS:::post op    HEALTH ISSUES - PROBLEM Dx:  Radiculopathy, cervical region: Radiculopathy, cervical region          PAST MEDICAL & SURGICAL HISTORY:  Headache  Insomnia  Depression  Anxiety  Chronic pain: in neck, bilateral shoulders, lower back  related to work injury  H/O shoulder surgery: right          Consultant NOTE  REVIEWED  (  +++ )    REVIEW OF SYSTEMS:  [x] As per HPI  CONSTITUTIONAL: No fever, weight loss, or fatigue  RESPIRATORY: No cough, wheezing, chills or hemoptysis; No Shortness of Breath  CARDIOVASCULAR: No chest pain, palpitations, dizziness, or leg swelling  GASTROINTESTINAL: No abdominal or epigastric pain. No nausea, vomiting, or hematemesis; No diarrhea or constipation. No melena or hematochezia.  MUSCULOSKELETAL: No joint pain or swelling; No muscle, back, or extremity pain s/p C spine  PSYCH    awake, alert       [x] All others negative	  [ ] Unable to obtain          Vital Signs Last 24 Hrs  T(C): 36.9, Max: 37.3 (05-24 @ 09:47)  T(F): 98.4, Max: 99.2 (05-24 @ 15:53)  HR: 72 (65 - 72)  BP: 119/66 (112/68 - 121/57)  BP(mean): --  RR: 16 (16 - 16)  SpO2: 96% (95% - 96%)      I & Os for 24h ending 05-24 @ 07:00  =============================================  IN: 1000 ml / OUT: 1823 ml / NET: -823 ml    I & Os for current day (as of 05-24 @ 22:09)  =============================================  IN: 0 ml / OUT: 500 ml / NET: -500 ml    PHYSICAL EXAMINATION:                                    (    )  NO CHANGE  Appearance: Normal	  HEENT:   Normal oral mucosa, PERRL, EOMI	  Neck: s/p C spine  Cardiovascular: Normal S1 S2, No JVD, No murmurs,   Respiratory: Lungs clear to auscultation/Decreased Breath Sounds/No Rales, Rhonchi, Wheezing	  Gastrointestinal:  Soft, Non-tender, + BS	  Skin: No rashes, No ecchymoses, No cyanosis  Extremities: Normal range of motion, No clubbing, cyanosis or edema  Vascular: Peripheral pulses palpable 2+ bilaterally  Neurologic:   Psychiatry: A & O x 3, Mood & affect appropriate    naloxone Injectable 0.1milliGRAM(s) IV Push every 3 minutes PRN  ondansetron Injectable 4milliGRAM(s) IV Push every 6 hours PRN  lactated ringers. 1000milliLiter(s) IV Continuous <Continuous>  famotidine    Tablet 20milliGRAM(s) Oral every 12 hours PRN  docusate sodium 100milliGRAM(s) Oral three times a day  magnesium hydroxide Suspension 30milliLiter(s) Oral every 12 hours PRN  metoclopramide Injectable 10milliGRAM(s) IV Push every 6 hours PRN  benzocaine 15 mG/menthol 3.6 mG Lozenge 1Lozenge Oral every 2 hours PRN  oxyCODONE IR 5milliGRAM(s) Oral every 4 hours PRN  oxyCODONE IR 10milliGRAM(s) Oral every 4 hours PRN  HYDROmorphone  Injectable 0.5milliGRAM(s) IV Push every 2 hours PRN  acetaminophen   Tablet 975milliGRAM(s) Oral every 8 hours  bisacodyl 5milliGRAM(s) Oral every 12 hours  senna 2Tablet(s) Oral at bedtime                                      11.6   10.3  )-----------( 203      ( 24 May 2017 06:38 )             35.1     05-24    142  |  102  |  11  ----------------------------<  95  3.7   |  30  |  0.80    Ca    8.6      24 May 2017 06:38        CAPILLARY BLOOD GLUCOSE

## 2017-05-24 NOTE — PROGRESS NOTE ADULT - SUBJECTIVE AND OBJECTIVE BOX
ORTHO NOTE    [X ] Pt seen/examined.  [ ] Pt without any complaints/in NAD.    [X ] Pt complains of:   c/o incisional and posterior neck pain but meds help.  Still has some UE parethesias.        ROS: [ ] Fever  [ ] Chills  [ ] CP [ ] SOB [ ] Dysnea  [ ] Palpitations [ ] Cough [ ] N/V/C/D [ ] Paresthia [ ] Other     [X ] ROS  otherwise negative    .    PHYSICAL EXAM:    Vital Signs Last 24 Hrs  T(C): 37.3, Max: 37.3 (05-24 @ 09:47)  T(F): 99.1, Max: 99.1 (05-24 @ 09:47)  HR: 67 (62 - 71)  BP: 121/57 (102/64 - 127/75)  BP(mean): --  RR: 16 (16 - 17)  SpO2: 95% (95% - 97%)    I&O's Detail    I & Os for current day (as of 24 May 2017 15:24)  =============================================  IN:    lactated ringers.: 1000 ml    Total IN: 1000 ml  ---------------------------------------------  OUT:    Voided: 1500 ml    Indwelling Catheter - Urethral: 300 ml    Drain: 23 ml    Total OUT: 1823 ml  ---------------------------------------------  Total NET: -823 ml       CAPILLARY BLOOD GLUCOSE                  Neuro:  NAD A and O x 3  Neck dsg c/d/i    Lungs:    CV:    ABD:     Ext:  RUE strength 4-4+/5 limited by pain, sens slightly decreased (preop), LUE strength 5/5 silt    LABS                        11.6   10.3  )-----------( 203      ( 24 May 2017 06:38 )             35.1                                05-24    142  |  102  |  11  ----------------------------<  95  3.7   |  30  |  0.80    Ca    8.6      24 May 2017 06:38        [ ] Other Labs  [ ] None ordered            Please check or [place] when present:  •  Heart Failure:    [ ] Acute        [ ]  Acute on Chronic        [ ] Chronic         [ ] Diastolic     [ ]  Combined    •  ABEBA:     [ ] ATN        [ ]  Renal medullary necrosis       [ ]  Renal cortical necrosis                  [ ] Other pathological Lesion:  •  CKD:  [ ] Stage I   [ ] Stage II  [ ] Stage III    [ ]Stage IV   [ ]  CKD V   [ ]  Other/Unspecified:    •  Abdominal Nutritional Status:   [ ] Malnutrition-See Nutrition note    [ ] Cachexia   [ ]  Other        [ ] Supplement ordered:            [ ] Morbid Obesity: BMI >=40         ASSESSMENT/PLAN:      STATUS POST: POD 2 ACDF C4 to C7     CONTINUE:          [ ] PT wbat    [ ] DVT PPX- scd    [ ] Pain Mgt service is following    [ ] Dispo plan- home 2m    D/c'ed HV.  Con't oob.  IS use.  Bowel regimen.

## 2017-05-24 NOTE — PROGRESS NOTE ADULT - SUBJECTIVE AND OBJECTIVE BOX
Pt. seen at 0841  Pain Management Progress Note - [facility] [phone]    HPI:  Pt. complains of headache and neck pain.  States pain medication is helpful.              Pertinent PMH: Pain at: ___Back _x__Neck___Knee ___Hip ___Shoulder ___ Opioid tolerance    Pain is ___ sharp ____dull ___burning __x_achy ___ Intensity: ____ mild ____mod ____severe     Location __x___surgical site ___x__cervical _____lumbar ____abd _____upper ext____lower ext    Worse with _x___activity __x__movement _____physical therapy___ Rest    Improved with ___x_medication _x___rest ____physical therapy      naloxone Injectable  ondansetron Injectable  lactated ringers.  famotidine    Tablet  docusate sodium  magnesium hydroxide Suspension  ([place]oor[place]) [completed]  (Floor[place]) [completed]  (Floor[place]) [completed]  (Floor[place]) [completed]  ([place]oor[place]) [completed]  (Floor[place]) [completed]  metoclopramide Injectable  benzocaine 15 mG/menthol 3.6 mG Lozenge  oxyCODONE IR  oxyCODONE IR  HYDROmorphone  Injectable  acetaminophen   Tablet      ROS: Const:  ___febrile   Eyes:___ENT:___CV: ___chest pain  Resp: ____sob  GI:_-__nausea _-__vomiting __-__abd pain ___npo ___clears _+__full diet __bm  :___ [place]: _+__pain ___spasm  Skin:___ Neuro:  _-__dmagzxda_-__cgootrmjx____ numbness ___weakness ___paresthesia  Psych:___anxiety  Endo:___ Heme:___Allergy:___      05-24 @ 06:3883 mL/min/1.73M2          Hemoglobin: 11.6 g/dL (05-24 @ 06:38)  Hemoglobin: 12.0 g/dL (05-23 @ 07:42)        T(C): 37.3, Max: 37.3 (05-24 @ 09:47)  HR: 67 (59 - 71)  BP: 121/57 (100/50 - 127/75)  RR: 16 (16 - 17)  SpO2: 95% (95% - 99%)  Wt(kg): --     PHYSICAL EXAM:  Gen Appearance: __x_no acute distress __x_appropriate         Neuro: ___SILT feet__x__ EOM Intact Psych: AAOX3__, x___mood/affect appropriate        Eyes: x___conjunctiva WNL  ___x__ Pupils equal and round        ENT: __x_ears and nose atraumatic_x__ Hearing grossly intact        Neck: ___trachea midline, no visible masses ___thyroid without palpable mass  soft cervical collar in place    Resp: _x__Nml WOB____No tactile fremitus ___clear to auscultation    Cardio: ___extremities free from edema ____pedal pulses palpable    GI/Abdomen: _-__soft ___-__ Nontender______Nondistended_____HSM    Lymphatic: ___no palpable nodes in neck  ___no palpable nodes calves and feet    Skin/Wound: ___Incision, ___Dressing c/d/i,   ____surrounding tissues soft,  ___drain/chest tube present____    Muscular: EHL ___/5  Gastrocnemius___/5    __x_absent clubbing/cyanosis  mild increased tone levator scapulae bilaterally       ASSESSMENT:  This is a 56y old Female with a history of:    Headache  Insomnia  Depression  Anxiety  Chronic pain  Radiculopathy, cervical region  H/O shoulder surgery  neck pain S/P ACDF C4-C7 and is doing a little better today.      Recommended Treatment PLAN:  1.  Dilaudid 0.5 mg IV q2h prn  2.  Oxycodone 5-10 mg po q4h prn  3.  Tylenol 975 mg po q8h

## 2017-05-25 VITALS
HEART RATE: 74 BPM | TEMPERATURE: 98 F | RESPIRATION RATE: 15 BRPM | DIASTOLIC BLOOD PRESSURE: 68 MMHG | SYSTOLIC BLOOD PRESSURE: 114 MMHG | OXYGEN SATURATION: 96 %

## 2017-05-25 LAB
ANION GAP SERPL CALC-SCNC: 13 MMOL/L — SIGNIFICANT CHANGE UP (ref 5–17)
BASOPHILS NFR BLD AUTO: 0.3 % — SIGNIFICANT CHANGE UP (ref 0–2)
BUN SERPL-MCNC: 9 MG/DL — SIGNIFICANT CHANGE UP (ref 7–23)
CALCIUM SERPL-MCNC: 8.9 MG/DL — SIGNIFICANT CHANGE UP (ref 8.4–10.5)
CHLORIDE SERPL-SCNC: 96 MMOL/L — SIGNIFICANT CHANGE UP (ref 96–108)
CO2 SERPL-SCNC: 30 MMOL/L — SIGNIFICANT CHANGE UP (ref 22–31)
CREAT SERPL-MCNC: 0.8 MG/DL — SIGNIFICANT CHANGE UP (ref 0.5–1.3)
EOSINOPHIL NFR BLD AUTO: 0.9 % — SIGNIFICANT CHANGE UP (ref 0–6)
GLUCOSE SERPL-MCNC: 87 MG/DL — SIGNIFICANT CHANGE UP (ref 70–99)
HCT VFR BLD CALC: 39.9 % — SIGNIFICANT CHANGE UP (ref 34.5–45)
HGB BLD-MCNC: 12.8 G/DL — SIGNIFICANT CHANGE UP (ref 11.5–15.5)
LYMPHOCYTES # BLD AUTO: 12.1 % — LOW (ref 13–44)
MCHC RBC-ENTMCNC: 27.8 PG — SIGNIFICANT CHANGE UP (ref 27–34)
MCHC RBC-ENTMCNC: 32.1 G/DL — SIGNIFICANT CHANGE UP (ref 32–36)
MCV RBC AUTO: 86.6 FL — SIGNIFICANT CHANGE UP (ref 80–100)
MONOCYTES NFR BLD AUTO: 7.6 % — SIGNIFICANT CHANGE UP (ref 2–14)
NEUTROPHILS NFR BLD AUTO: 79.1 % — HIGH (ref 43–77)
PLATELET # BLD AUTO: 205 K/UL — SIGNIFICANT CHANGE UP (ref 150–400)
POTASSIUM SERPL-MCNC: 4.1 MMOL/L — SIGNIFICANT CHANGE UP (ref 3.5–5.3)
POTASSIUM SERPL-SCNC: 4.1 MMOL/L — SIGNIFICANT CHANGE UP (ref 3.5–5.3)
RBC # BLD: 4.61 M/UL — SIGNIFICANT CHANGE UP (ref 3.8–5.2)
RBC # FLD: 13.6 % — SIGNIFICANT CHANGE UP (ref 10.3–16.9)
SODIUM SERPL-SCNC: 139 MMOL/L — SIGNIFICANT CHANGE UP (ref 135–145)
WBC # BLD: 10.2 K/UL — SIGNIFICANT CHANGE UP (ref 3.8–10.5)
WBC # FLD AUTO: 10.2 K/UL — SIGNIFICANT CHANGE UP (ref 3.8–10.5)

## 2017-05-25 PROCEDURE — 86850 RBC ANTIBODY SCREEN: CPT

## 2017-05-25 PROCEDURE — C1889: CPT

## 2017-05-25 PROCEDURE — 36415 COLL VENOUS BLD VENIPUNCTURE: CPT

## 2017-05-25 PROCEDURE — 80048 BASIC METABOLIC PNL TOTAL CA: CPT

## 2017-05-25 PROCEDURE — 86900 BLOOD TYPING SEROLOGIC ABO: CPT

## 2017-05-25 PROCEDURE — 95940 IONM IN OPERATNG ROOM 15 MIN: CPT

## 2017-05-25 PROCEDURE — 85025 COMPLETE CBC W/AUTO DIFF WBC: CPT

## 2017-05-25 PROCEDURE — 86901 BLOOD TYPING SEROLOGIC RH(D): CPT

## 2017-05-25 PROCEDURE — 76000 FLUOROSCOPY <1 HR PHYS/QHP: CPT

## 2017-05-25 PROCEDURE — C1713: CPT

## 2017-05-25 RX ORDER — ZALEPLON 10 MG
1 CAPSULE ORAL
Qty: 0 | Refills: 0 | COMMUNITY

## 2017-05-25 RX ORDER — DOCUSATE SODIUM 100 MG
1 CAPSULE ORAL
Qty: 0 | Refills: 0 | COMMUNITY
Start: 2017-05-25

## 2017-05-25 RX ORDER — ACETAMINOPHEN WITH CODEINE 300MG-30MG
1 TABLET ORAL
Qty: 0 | Refills: 0 | COMMUNITY

## 2017-05-25 RX ADMIN — OXYCODONE HYDROCHLORIDE 10 MILLIGRAM(S): 5 TABLET ORAL at 07:03

## 2017-05-25 RX ADMIN — OXYCODONE HYDROCHLORIDE 10 MILLIGRAM(S): 5 TABLET ORAL at 03:03

## 2017-05-25 RX ADMIN — OXYCODONE HYDROCHLORIDE 10 MILLIGRAM(S): 5 TABLET ORAL at 11:55

## 2017-05-25 RX ADMIN — OXYCODONE HYDROCHLORIDE 10 MILLIGRAM(S): 5 TABLET ORAL at 04:00

## 2017-05-25 RX ADMIN — OXYCODONE HYDROCHLORIDE 10 MILLIGRAM(S): 5 TABLET ORAL at 08:00

## 2017-05-25 RX ADMIN — Medication 5 MILLIGRAM(S): at 06:08

## 2017-05-25 RX ADMIN — Medication 975 MILLIGRAM(S): at 06:08

## 2017-05-25 RX ADMIN — OXYCODONE HYDROCHLORIDE 10 MILLIGRAM(S): 5 TABLET ORAL at 11:23

## 2017-05-25 RX ADMIN — Medication 100 MILLIGRAM(S): at 06:08

## 2017-05-25 NOTE — PROGRESS NOTE ADULT - SUBJECTIVE AND OBJECTIVE BOX
S: Patient seen and examined. Doing well this AM. Pain reported but controlled. No acute events overnight.    O:  Vital Signs Last 24 Hrs  T(C): 36.7, Max: 37.3 (05-24 @ 09:47)  T(F): 98, Max: 99.2 (05-24 @ 15:53)  HR: 82 (65 - 82)  BP: 125/74 (115/68 - 125/74)  BP(mean): --  RR: 16 (16 - 16)  SpO2: 96% (95% - 96%)    Motor: BL intact WG/WF/WE/T/Deltoid, able to fire fingers actively  BL AIN/PIN/Ulnar nerve distribution intact  BL radial and ulnar a. well perfused  Dressing C/D/I.

## 2017-05-27 DIAGNOSIS — M54.12 RADICULOPATHY, CERVICAL REGION: ICD-10-CM

## 2017-05-27 DIAGNOSIS — M50.121 CERVICAL DISC DISORDER AT C4-C5 LEVEL WITH RADICULOPATHY: ICD-10-CM

## 2017-05-27 DIAGNOSIS — M50.122 CERVICAL DISC DISORDER AT C5-C6 LEVEL WITH RADICULOPATHY: ICD-10-CM

## 2017-05-27 DIAGNOSIS — F41.8 OTHER SPECIFIED ANXIETY DISORDERS: ICD-10-CM

## 2017-05-27 DIAGNOSIS — M50.123 CERVICAL DISC DISORDER AT C6-C7 LEVEL WITH RADICULOPATHY: ICD-10-CM

## 2017-05-27 DIAGNOSIS — M50.022 CERVICAL DISC DISORDER AT C5-C6 LEVEL WITH MYELOPATHY: ICD-10-CM

## 2017-05-27 DIAGNOSIS — G89.29 OTHER CHRONIC PAIN: ICD-10-CM

## 2017-05-27 DIAGNOSIS — M50.021 CERVICAL DISC DISORDER AT C4-C5 LEVEL WITH MYELOPATHY: ICD-10-CM

## 2017-05-27 DIAGNOSIS — M50.023 CERVICAL DISC DISORDER AT C6-C7 LEVEL WITH MYELOPATHY: ICD-10-CM

## 2017-05-27 DIAGNOSIS — M48.02 SPINAL STENOSIS, CERVICAL REGION: ICD-10-CM

## 2017-05-31 DIAGNOSIS — G95.89 OTHER SPECIFIED DISEASES OF SPINAL CORD: ICD-10-CM

## 2018-01-31 NOTE — PATIENT PROFILE ADULT. - HOW PATIENT ADDRESSED, PROFILE
----- Message from Nilton Silva MD sent at 1/31/2018 10:55 AM EST -----  Ary - Let her know that her pap was normal.   Елена

## 2021-04-28 NOTE — PATIENT PROFILE ADULT. - MEDICATION HERBAL REMEDIES, PROFILE
----- Message from Fabien Dasilva DO sent at 4/27/2021  3:19 PM CDT -----  Please notify the patient of normal results.  Follow-up as planned. Lyme's test.     yes

## 2024-12-15 NOTE — DISCHARGE NOTE ADULT - VISION (WITH CORRECTIVE LENSES IF THE PATIENT USUALLY WEARS THEM):
Universal Safety Interventions Normal vision: sees adequately in most situations; can see medication labels, newsprint